# Patient Record
Sex: MALE | Race: WHITE | Employment: FULL TIME | URBAN - METROPOLITAN AREA
[De-identification: names, ages, dates, MRNs, and addresses within clinical notes are randomized per-mention and may not be internally consistent; named-entity substitution may affect disease eponyms.]

---

## 2019-05-06 ENCOUNTER — MEDICAL CORRESPONDENCE (OUTPATIENT)
Dept: HEALTH INFORMATION MANAGEMENT | Facility: CLINIC | Age: 54
End: 2019-05-06

## 2019-07-19 DIAGNOSIS — Z84.81 FAMILY HISTORY OF CARRIER OF GENETIC DISEASE: Primary | ICD-10-CM

## 2019-07-19 DIAGNOSIS — Z84.81 FAMILY HISTORY OF GENETIC DISEASE CARRIER: Primary | ICD-10-CM

## 2019-07-19 PROCEDURE — 88264 CHROMOSOME ANALYSIS 20-25: CPT | Performed by: OBSTETRICS & GYNECOLOGY

## 2019-07-19 PROCEDURE — 88289 CHROMOSOME STUDY ADDITIONAL: CPT | Performed by: OBSTETRICS & GYNECOLOGY

## 2019-07-19 PROCEDURE — 88230 TISSUE CULTURE LYMPHOCYTE: CPT | Performed by: OBSTETRICS & GYNECOLOGY

## 2019-07-19 PROCEDURE — 36415 COLL VENOUS BLD VENIPUNCTURE: CPT | Performed by: OBSTETRICS & GYNECOLOGY

## 2019-07-19 NOTE — PROGRESS NOTES
Deon Lr was seen with his wife, Suyapa Lr, at the [facility] for genetic consultation for the indication of family history of a chromosome 18 pericentric inversion.       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 An inversion occurs when a part of a chromosome (the packages of DNA that provide the blueprints for the body) breaks off, rotates 180 degrees, and reinserts itself back into the same chromosome. All genetic material is present, but it is arranged arranged a little differently. Although this inversion has not caused any medical concerns for Andrew, he has an increased risk to pass on an unbalanced amount of genetic material to his children, which could result in miscarriage, congenital anomalies and/or intellectual disability.  At this time it is unknown whether Andrew inherited this inversion from one of his parents or if it is brand new (de eb) in him.      Suyapa and Deon met with me today to discuss testing each of them for the same inversion identified in Andrew. If either of them carries the inversion, their other family members would be at risk to also have the inversion, impacting their reproductive risks.      Suyapa and Deon each elected to pursue chromosome analysis (karyotype) today. Results are expected in 4-5 weeks and we will contact the couple when they become available. Deon requested that I communicate his results directly to Suyapa and signed a consent for me to discuss his PHI with her. I will call Suyapa at [phone] with the results. She requested that any voicemail only state that the results are ready.         It was a pleasure to be involved with Deon's care. Face-to-face time of the meeting was 45 minutes.        Nova Perez MS, [facility]  Maternal Fetal Medicine  [facility]  Ph:  [phone]  [email or web address]

## 2019-08-19 LAB — COPATH REPORT: NORMAL

## 2019-08-30 ENCOUNTER — TELEPHONE (OUTPATIENT)
Dept: MATERNAL FETAL MEDICINE | Facility: CLINIC | Age: 54
End: 2019-08-30

## 2019-08-30 NOTE — TELEPHONE ENCOUNTER
I called Deon's wife, Linda, to discuss the karyotype results we received Neelam. 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 This is the same inversion identified in Deon's son, Andrew, indicating Andrew inherited the inversion from Deon.     I explained that this means a large middle piece of one copy of Deon's chromosome 18 is flipped around, but no genetic material is missing for him. People who have this type of inversion are at an increased risk to create gametes with an unbalanced chromosome 18, thus leading to increased risk for miscarriage and offspring with congenital anomalies and/or intellectual disability.      We reviewed that identifying this inversion in Deon, means that his other relatives could possibly carry the inversion as well and would then also be at risk for miscarriages or to have children with congenital anomalies and/or intellectual disability. It is recommended that they consider testing to further assess their reproductive risks. We discussed in particular, that it is recommended Andrew's brother be tested for the inversion when he is ready.     Linda requested that I send a family letter like the one I provided to her son and daughter-in-law. Family members can bring this letter to their doctors as an explanation of what is going on and what testing is recommended. Their doctors can decide if they are comfortable ordering that testing themselves or would prefer to refer to genetics. That way the family members can stay within their healthcare systems if desired. I will also include [facility] clinic info in case they would like to refer back to us for this testing. Linda and her family have my contact information and are encouraged to call me with questions. Linda's questions today were answered to her apparent satisfaction.      Nova Perez MS, [facility]  Maternal Fetal Medicine  [facility]  Ph:  [phone]  [email or web address]